# Patient Record
Sex: FEMALE | Race: WHITE
[De-identification: names, ages, dates, MRNs, and addresses within clinical notes are randomized per-mention and may not be internally consistent; named-entity substitution may affect disease eponyms.]

---

## 2020-01-02 ENCOUNTER — HOSPITAL ENCOUNTER (EMERGENCY)
Dept: HOSPITAL 84 - D.ER | Age: 25
Discharge: HOME | End: 2020-01-02
Payer: SELF-PAY

## 2020-01-02 VITALS
BODY MASS INDEX: 30.89 KG/M2 | BODY MASS INDEX: 30.89 KG/M2 | HEIGHT: 65 IN | WEIGHT: 185.39 LBS | HEIGHT: 65 IN | WEIGHT: 185.39 LBS

## 2020-01-02 VITALS — SYSTOLIC BLOOD PRESSURE: 113 MMHG | DIASTOLIC BLOOD PRESSURE: 78 MMHG

## 2020-01-02 DIAGNOSIS — G47.30: ICD-10-CM

## 2020-01-02 DIAGNOSIS — F44.5: Primary | ICD-10-CM

## 2020-01-16 ENCOUNTER — HOSPITAL ENCOUNTER (OUTPATIENT)
Dept: HOSPITAL 84 - D.MS | Age: 25
Discharge: HOME | End: 2020-01-16
Attending: UROLOGY
Payer: SELF-PAY

## 2020-01-16 VITALS — DIASTOLIC BLOOD PRESSURE: 73 MMHG | SYSTOLIC BLOOD PRESSURE: 115 MMHG

## 2020-01-16 VITALS — DIASTOLIC BLOOD PRESSURE: 62 MMHG | SYSTOLIC BLOOD PRESSURE: 101 MMHG

## 2020-01-16 VITALS — DIASTOLIC BLOOD PRESSURE: 70 MMHG | SYSTOLIC BLOOD PRESSURE: 119 MMHG

## 2020-01-16 VITALS — SYSTOLIC BLOOD PRESSURE: 108 MMHG | DIASTOLIC BLOOD PRESSURE: 57 MMHG

## 2020-01-16 VITALS — BODY MASS INDEX: 30.05 KG/M2 | HEIGHT: 65 IN | WEIGHT: 180.38 LBS

## 2020-01-16 VITALS — DIASTOLIC BLOOD PRESSURE: 61 MMHG | SYSTOLIC BLOOD PRESSURE: 104 MMHG

## 2020-01-16 VITALS — SYSTOLIC BLOOD PRESSURE: 101 MMHG | DIASTOLIC BLOOD PRESSURE: 62 MMHG

## 2020-01-16 DIAGNOSIS — R11.2: ICD-10-CM

## 2020-01-16 DIAGNOSIS — N20.1: Primary | ICD-10-CM

## 2020-01-16 DIAGNOSIS — N20.0: ICD-10-CM

## 2020-01-16 LAB
ALBUMIN SERPL-MCNC: 4.2 G/DL (ref 3.4–5)
ALP SERPL-CCNC: 75 U/L (ref 46–116)
ALT SERPL-CCNC: 30 U/L (ref 10–68)
AMYLASE SERPL-CCNC: 40 U/L (ref 25–115)
ANION GAP SERPL CALC-SCNC: 12.8 MMOL/L (ref 8–16)
APPEARANCE UR: (no result)
BACTERIA #/AREA URNS HPF: (no result) /HPF
BASOPHILS NFR BLD AUTO: 0.2 % (ref 0–2)
BILIRUB SERPL-MCNC: 0.95 MG/DL (ref 0.2–1.3)
BILIRUB SERPL-MCNC: NEGATIVE MG/DL
BUN SERPL-MCNC: 10 MG/DL (ref 7–18)
CALCIUM SERPL-MCNC: 11.3 MG/DL (ref 8.5–10.1)
CHLORIDE SERPL-SCNC: 104 MMOL/L (ref 98–107)
CO2 SERPL-SCNC: 26 MMOL/L (ref 21–32)
COLOR UR: YELLOW
CREAT SERPL-MCNC: 1.1 MG/DL (ref 0.6–1.3)
EOSINOPHIL NFR BLD: 0.1 % (ref 0–7)
ERYTHROCYTE [DISTWIDTH] IN BLOOD BY AUTOMATED COUNT: 12.3 % (ref 11.5–14.5)
GLOBULIN SER-MCNC: 4.1 G/L
GLUCOSE SERPL-MCNC: 93 MG/DL (ref 74–106)
GLUCOSE SERPL-MCNC: NEGATIVE MG/DL
HCG SERPL-ACNC: NEGATIVE M[IU]/ML
HCT VFR BLD CALC: 37.8 % (ref 36–48)
HGB BLD-MCNC: 12.9 G/DL (ref 12–16)
IMM GRANULOCYTES NFR BLD: 0.2 % (ref 0–5)
KETONES UR STRIP-MCNC: (no result) MG/DL
LIPASE SERPL-CCNC: 139 U/L (ref 73–393)
LYMPHOCYTES NFR BLD AUTO: 20.5 % (ref 15–50)
MCH RBC QN AUTO: 28.5 PG (ref 26–34)
MCHC RBC AUTO-ENTMCNC: 34.1 G/DL (ref 31–37)
MCV RBC: 83.4 FL (ref 80–100)
MONOCYTES NFR BLD: 17.3 % (ref 2–11)
MUCOUS THREADS #/AREA URNS LPF: (no result) /LPF
NEUTROPHILS NFR BLD AUTO: 61.7 % (ref 40–80)
NITRITE UR-MCNC: NEGATIVE MG/ML
OSMOLALITY SERPL CALC.SUM OF ELEC: 276 MOSM/KG (ref 275–300)
PH UR STRIP: 5 [PH] (ref 5–6)
PLATELET # BLD: 211 10X3/UL (ref 130–400)
PMV BLD AUTO: 10 FL (ref 7.4–10.4)
POTASSIUM SERPL-SCNC: 3.8 MMOL/L (ref 3.5–5.1)
PROT SERPL-MCNC: 8.3 G/DL (ref 6.4–8.2)
PROT UR-MCNC: (no result) MG/DL
RBC # BLD AUTO: 4.53 10X6/UL (ref 4–5.4)
RBC #/AREA URNS HPF: (no result) /HPF (ref 0–5)
SODIUM SERPL-SCNC: 139 MMOL/L (ref 136–145)
SP GR UR STRIP: 1.02 (ref 1–1.02)
SQUAMOUS #/AREA URNS HPF: (no result) /HPF (ref 0–5)
UROBILINOGEN UR-MCNC: 4 MG/DL
WBC # BLD AUTO: 8.4 10X3/UL (ref 4.8–10.8)

## 2020-01-17 NOTE — OP
PATIENT NAME:  MARY GALVEZ                                MEDICAL RECORD: [id number]
:[birth year]                                             LOCATION:D.MS           
                                                         ADMISSION DATE:        
SURGEON:  FROYLAN DACOSTA MD              
 
 
DATE OF OPERATION:  2020
 
SURGEON:  Froylan Dacosta MD
 
ANESTHESIA:  TIVA by DARLENE Lewis CRNA.
 
DIAGNOSIS:  Left renal stones with 9 mm in the proximal ureter and 7 mm in the
kidney.
 
PROCEDURES:  Cystoscopy, left retrograde pyelogram, left ureteral stent
insertion 6-Vatican citizen x 24 cm with string attached.
 
FINDINGS:  Inflamed bladder consistent with cystitis.  Single ureteral orifices
bilaterally.  No bladder tumors.  Radiodense left-sided renal stones on
fluoroscopy.
 
BLOOD LOSS:  None.
 
CLINICAL HISTORY:  This is a 24-year-old female with no previous history of
kidney stones.  She has a 4-day history of left flank pain with nausea and
vomiting.  She came to the Emergency Room today.  A CT scan shows a 9-mm stone
in the left proximal ureter.  There were some smaller stones in the left kidney
with the largest of them being 7 mm in size.  Her urinalysis is suggestive of
cystitis.  I started her on IV Levaquin after obtaining a urine culture.  She
comes now to have a left ureteral stent inserted.  As an outpatient, she will be
getting left lithotripsy to treat the stones.
 
DESCRIPTION OF PROCEDURE:  The patient was given IV sedation.  She was then
placed in the lithotomy position.  We performed fluoroscopy and we could see two
densities in the left renal region which are probably our stones.  Cystoscopy
was performed using a 21-Vatican citizen cystoscope.  The bladder was diffusely inflamed,
consistent with cystitis.  No bladder tumors were seen.  A 5-Vatican citizen open-ended
ureteral catheter was inserted into the left ureteral orifice and dilated
contrast was injected for a retrograde pyelogram.  This showed that the
radiodensities were indeed our renal stones.  The stone in the proximal ureter
is creating hydronephrosis proximal to it.  Through the lumen of the ureteral
catheter, we inserted a sensor wire up into the renal pelvis.  The ureteral
catheter was then removed, leaving the wire in place.  Over the wire, we
inserted a 6-Vatican citizen x 24 cm ureteral stent.  Once the stent was in correct
position, the wire was withdrawn entirely.  The stent was seen to coil
proximally and distally.  The bladder was then emptied through the cystoscope
sheath and the scope was removed.  The string on the distal end of the stent was
maintained.  It was taped to the suprapubic region with a small piece of
Tegaderm.  The patient will be discharged home with Norco, tamsulosin, and
Levaquin.  We will be contacting her to arrange lithotripsy.
 
TRANSINT:[id number] Voice Confirmation ID: [id number] DOCUMENT ID: [id number]
 
 
 
OPERATIVE REPORT                               [id number]    OHL,FROYLAN POWER MD              
 
 
 
Electronically Signed by FROYLAN DACOSTA on 20 at 0843
 
 
 
 
 
 
 
 
 
 
 
 
 
 
 
 
 
 
 
 
 
 
 
 
 
 
 
 
 
 
 
 
 
 
 
 
 
 
 
 
 
CC:                                                             [id number]
DICTATION DATE: 20 1535     :     20 1652      [facility] [place] 
                                                                      20
[facility]                                          
[address] [place] [address]

## 2020-01-31 ENCOUNTER — HOSPITAL ENCOUNTER (EMERGENCY)
Dept: HOSPITAL 84 - D.ER | Age: 25
Discharge: OTHER INSTITUTION | End: 2020-01-31
Payer: MEDICAID

## 2020-01-31 VITALS — DIASTOLIC BLOOD PRESSURE: 81 MMHG | SYSTOLIC BLOOD PRESSURE: 129 MMHG

## 2020-01-31 VITALS — WEIGHT: 180.38 LBS | HEIGHT: 65 IN | BODY MASS INDEX: 30.05 KG/M2

## 2020-01-31 DIAGNOSIS — R31.9: ICD-10-CM

## 2020-01-31 DIAGNOSIS — N13.30: Primary | ICD-10-CM

## 2020-01-31 DIAGNOSIS — N20.0: ICD-10-CM

## 2020-01-31 DIAGNOSIS — R52: ICD-10-CM

## 2020-01-31 LAB
ALBUMIN SERPL-MCNC: 4.1 G/DL (ref 3.4–5)
ALP SERPL-CCNC: 62 U/L (ref 30–120)
ALT SERPL-CCNC: 18 U/L (ref 10–68)
ANION GAP SERPL CALC-SCNC: 14.7 MMOL/L (ref 8–16)
APPEARANCE UR: (no result)
BACTERIA #/AREA URNS HPF: (no result) /HPF
BASOPHILS NFR BLD AUTO: 0.1 % (ref 0–2)
BILIRUB SERPL-MCNC: 0.73 MG/DL (ref 0.2–1.3)
BILIRUB SERPL-MCNC: NEGATIVE MG/DL
BUN SERPL-MCNC: 8 MG/DL (ref 7–18)
CALCIUM SERPL-MCNC: 11.2 MG/DL (ref 8.5–10.1)
CHLORIDE SERPL-SCNC: 105 MMOL/L (ref 98–107)
CO2 SERPL-SCNC: 24.3 MMOL/L (ref 21–32)
COLOR UR: YELLOW
CREAT SERPL-MCNC: 0.7 MG/DL (ref 0.6–1.3)
EOSINOPHIL NFR BLD: 0.8 % (ref 0–7)
ERYTHROCYTE [DISTWIDTH] IN BLOOD BY AUTOMATED COUNT: 12.7 % (ref 11.5–14.5)
GLOBULIN SER-MCNC: 3.8 G/L
GLUCOSE SERPL-MCNC: 85 MG/DL (ref 74–106)
GLUCOSE SERPL-MCNC: NEGATIVE MG/DL
HCG UR QL: NEGATIVE
HCT VFR BLD CALC: 38 % (ref 36–48)
HGB BLD-MCNC: 13 G/DL (ref 12–16)
IMM GRANULOCYTES NFR BLD: 0.3 % (ref 0–5)
KETONES UR STRIP-MCNC: NEGATIVE MG/DL
LYMPHOCYTES NFR BLD AUTO: 18 % (ref 15–50)
MCH RBC QN AUTO: 28.3 PG (ref 26–34)
MCHC RBC AUTO-ENTMCNC: 34.2 G/DL (ref 31–37)
MCV RBC: 82.8 FL (ref 80–100)
MONOCYTES NFR BLD: 7.3 % (ref 2–11)
NEUTROPHILS NFR BLD AUTO: 73.5 % (ref 40–80)
NITRITE UR-MCNC: POSITIVE MG/ML
OSMOLALITY SERPL CALC.SUM OF ELEC: 275 MOSM/KG (ref 275–300)
PH UR STRIP: 7 [PH] (ref 5–6)
PLATELET # BLD: 289 10X3/UL (ref 130–400)
PMV BLD AUTO: 9.9 FL (ref 7.4–10.4)
POTASSIUM SERPL-SCNC: 4 MMOL/L (ref 3.5–5.1)
PROT SERPL-MCNC: 7.9 G/DL (ref 6.4–8.2)
PROT UR-MCNC: (no result) MG/DL
RBC # BLD AUTO: 4.59 10X6/UL (ref 4–5.4)
RBC #/AREA URNS HPF: (no result) /HPF (ref 0–5)
SODIUM SERPL-SCNC: 140 MMOL/L (ref 136–145)
SP GR UR STRIP: 1.01 (ref 1–1.02)
SQUAMOUS #/AREA URNS HPF: (no result) /HPF (ref 0–5)
UROBILINOGEN UR-MCNC: NORMAL MG/DL
WBC # BLD AUTO: 14.2 10X3/UL (ref 4.8–10.8)
WBC #/AREA URNS HPF: (no result) /HPF

## 2020-02-18 ENCOUNTER — HOSPITAL ENCOUNTER (INPATIENT)
Dept: HOSPITAL 84 - D.ER | Age: 25
LOS: 4 days | Discharge: HOME | DRG: 690 | End: 2020-02-22
Attending: FAMILY MEDICINE | Admitting: FAMILY MEDICINE
Payer: MEDICAID

## 2020-02-18 VITALS
HEIGHT: 65 IN | BODY MASS INDEX: 29.22 KG/M2 | WEIGHT: 175.37 LBS | BODY MASS INDEX: 29.22 KG/M2 | HEIGHT: 65 IN | WEIGHT: 175.37 LBS

## 2020-02-18 DIAGNOSIS — B96.89: ICD-10-CM

## 2020-02-18 DIAGNOSIS — N39.0: ICD-10-CM

## 2020-02-18 DIAGNOSIS — N20.0: ICD-10-CM

## 2020-02-18 DIAGNOSIS — D64.9: ICD-10-CM

## 2020-02-18 DIAGNOSIS — N10: Primary | ICD-10-CM

## 2020-02-18 DIAGNOSIS — E87.6: ICD-10-CM

## 2020-02-18 LAB
ALBUMIN SERPL-MCNC: 3 G/DL (ref 3.4–5)
ALP SERPL-CCNC: 65 U/L (ref 30–120)
ALT SERPL-CCNC: 12 U/L (ref 10–68)
ANION GAP SERPL CALC-SCNC: 13 MMOL/L (ref 8–16)
BACTERIA #/AREA URNS HPF: (no result) /HPF
BASOPHILS NFR BLD AUTO: 0.1 % (ref 0–2)
BILIRUB SERPL-MCNC: 0.63 MG/DL (ref 0.2–1.3)
BILIRUB SERPL-MCNC: NEGATIVE MG/DL
BUN SERPL-MCNC: 10 MG/DL (ref 7–18)
CALCIUM SERPL-MCNC: 11.3 MG/DL (ref 8.5–10.1)
CHLORIDE SERPL-SCNC: 99 MMOL/L (ref 98–107)
CO2 SERPL-SCNC: 24.4 MMOL/L (ref 21–32)
CREAT SERPL-MCNC: 0.9 MG/DL (ref 0.6–1.3)
EOSINOPHIL NFR BLD: 0 % (ref 0–7)
ERYTHROCYTE [DISTWIDTH] IN BLOOD BY AUTOMATED COUNT: 12.7 % (ref 11.5–14.5)
GLOBULIN SER-MCNC: 4.2 G/L
GLUCOSE SERPL-MCNC: 110 MG/DL (ref 74–106)
GLUCOSE SERPL-MCNC: NEGATIVE MG/DL
HCG UR QL: NEGATIVE
HCT VFR BLD CALC: 35.2 % (ref 36–48)
HGB BLD-MCNC: 11.9 G/DL (ref 12–16)
IMM GRANULOCYTES NFR BLD: 0.4 % (ref 0–5)
KETONES UR STRIP-MCNC: (no result) MG/DL
LYMPHOCYTES NFR BLD AUTO: 5.2 % (ref 15–50)
MCH RBC QN AUTO: 28.3 PG (ref 26–34)
MCHC RBC AUTO-ENTMCNC: 33.8 G/DL (ref 31–37)
MCV RBC: 83.6 FL (ref 80–100)
MONOCYTES NFR BLD: 19.7 % (ref 2–11)
NEUTROPHILS NFR BLD AUTO: 74.6 % (ref 40–80)
NITRITE UR-MCNC: NEGATIVE MG/ML
OSMOLALITY SERPL CALC.SUM OF ELEC: 265 MOSM/KG (ref 275–300)
PH UR STRIP: 5 [PH] (ref 5–6)
PLATELET # BLD: 156 10X3/UL (ref 130–400)
PMV BLD AUTO: 11.2 FL (ref 7.4–10.4)
POTASSIUM SERPL-SCNC: 3.4 MMOL/L (ref 3.5–5.1)
PROT SERPL-MCNC: 7.2 G/DL (ref 6.4–8.2)
RBC # BLD AUTO: 4.21 10X6/UL (ref 4–5.4)
RBC #/AREA URNS HPF: (no result) /HPF (ref 0–5)
SODIUM SERPL-SCNC: 133 MMOL/L (ref 136–145)
SP GR UR STRIP: 1.01 (ref 1–1.02)
SQUAMOUS #/AREA URNS HPF: (no result) /HPF (ref 0–5)
UROBILINOGEN UR-MCNC: 1 MG/DL
WBC # BLD AUTO: 12 10X3/UL (ref 4.8–10.8)

## 2020-02-18 NOTE — NUR
PT TO ROOM 2237 FROM ER VIA WHEELCHAIR. PT IS WITHOUT DISTRESS.ASSESSMENT PER
ADMIT PACK.CALL LIGHT IN REACH

## 2020-02-19 LAB
ANION GAP SERPL CALC-SCNC: 12.1 MMOL/L (ref 8–16)
ANION GAP SERPL CALC-SCNC: 18.6 MMOL/L (ref 8–16)
APTT BLD: 33.9 SECONDS (ref 22.8–39.4)
BASOPHILS NFR BLD AUTO: 0.1 % (ref 0–2)
BUN SERPL-MCNC: 14 MG/DL (ref 7–18)
BUN SERPL-MCNC: 8 MG/DL (ref 7–18)
CALCIUM SERPL-MCNC: 11 MG/DL (ref 8.5–10.1)
CALCIUM SERPL-MCNC: 11.6 MG/DL (ref 8.5–10.1)
CHLORIDE SERPL-SCNC: 101 MMOL/L (ref 98–107)
CHLORIDE SERPL-SCNC: 103 MMOL/L (ref 98–107)
CO2 SERPL-SCNC: 20.3 MMOL/L (ref 21–32)
CO2 SERPL-SCNC: 25 MMOL/L (ref 21–32)
CREAT SERPL-MCNC: 0.8 MG/DL (ref 0.6–1.3)
CREAT SERPL-MCNC: 0.8 MG/DL (ref 0.6–1.3)
EOSINOPHIL NFR BLD: 0 % (ref 0–7)
ERYTHROCYTE [DISTWIDTH] IN BLOOD BY AUTOMATED COUNT: 13 % (ref 11.5–14.5)
GLUCOSE SERPL-MCNC: 86 MG/DL (ref 74–106)
GLUCOSE SERPL-MCNC: 93 MG/DL (ref 74–106)
HCT VFR BLD CALC: 29.6 % (ref 36–48)
HGB BLD-MCNC: 10 G/DL (ref 12–16)
IMM GRANULOCYTES NFR BLD: 0.5 % (ref 0–5)
INR PPP: 1.22 (ref 0.85–1.17)
LYMPHOCYTES NFR BLD AUTO: 8.5 % (ref 15–50)
MAGNESIUM SERPL-MCNC: 1.8 MG/DL (ref 1.8–2.4)
MCH RBC QN AUTO: 27.9 PG (ref 26–34)
MCHC RBC AUTO-ENTMCNC: 33.8 G/DL (ref 31–37)
MCV RBC: 82.7 FL (ref 80–100)
MONOCYTES NFR BLD: 21.7 % (ref 2–11)
NEUTROPHILS NFR BLD AUTO: 69.2 % (ref 40–80)
OSMOLALITY SERPL CALC.SUM OF ELEC: 267 MOSM/KG (ref 275–300)
OSMOLALITY SERPL CALC.SUM OF ELEC: 275 MOSM/KG (ref 275–300)
PHOSPHATE SERPL-MCNC: 2.1 MG/DL (ref 2.5–4.9)
PLATELET # BLD: 149 10X3/UL (ref 130–400)
PMV BLD AUTO: 10.5 FL (ref 7.4–10.4)
POTASSIUM SERPL-SCNC: 3.1 MMOL/L (ref 3.5–5.1)
POTASSIUM SERPL-SCNC: 3.9 MMOL/L (ref 3.5–5.1)
PROTHROMBIN TIME: 15.4 SECONDS (ref 11.6–15)
RBC # BLD AUTO: 3.58 10X6/UL (ref 4–5.4)
SODIUM SERPL-SCNC: 135 MMOL/L (ref 136–145)
SODIUM SERPL-SCNC: 138 MMOL/L (ref 136–145)
WBC # BLD AUTO: 9.3 10X3/UL (ref 4.8–10.8)

## 2020-02-19 NOTE — NUR
REC'D. IN BED DURING WALKING ROUNDS. EYES CLOSED RESP DEEP AND EVEN.WILL
CONTINUE TO MONITOR FOR ANY CHGES IN CURRENT STATUS AND FOLLOW PLAN OF CARE.

## 2020-02-19 NOTE — NUR
PT RESTING IN BED. NO SIGNS OF DISTRESS. IV TO LEFT HAND PATENT NO REDNESS OR
TENDERNESS. DENIES ANY FURTHER NEED AT THIS TIME. CALL LIGHT IN REACH. BED LOW
POSITION. FAMILY AT BEDSIDE.

## 2020-02-20 LAB
ANION GAP SERPL CALC-SCNC: 10.2 MMOL/L (ref 8–16)
BUN SERPL-MCNC: 8 MG/DL (ref 7–18)
CALCIUM SERPL-MCNC: 11.1 MG/DL (ref 8.5–10.1)
CHLORIDE SERPL-SCNC: 104 MMOL/L (ref 98–107)
CO2 SERPL-SCNC: 26.8 MMOL/L (ref 21–32)
CREAT SERPL-MCNC: 0.7 MG/DL (ref 0.6–1.3)
ERYTHROCYTE [DISTWIDTH] IN BLOOD BY AUTOMATED COUNT: 13.2 % (ref 11.5–14.5)
FERRITIN SERPL-MCNC: 291 NG/ML (ref 3–244)
GLUCOSE SERPL-MCNC: 88 MG/DL (ref 74–106)
HCT VFR BLD CALC: 31.3 % (ref 36–48)
HGB BLD-MCNC: 10.5 G/DL (ref 12–16)
IRON SERPL-MCNC: 9 UG/DL (ref 35–150)
LYMPHOCYTES NFR BLD AUTO: 17 % (ref 15–50)
MAGNESIUM SERPL-MCNC: 1.7 MG/DL (ref 1.8–2.4)
MCH RBC QN AUTO: 27.7 PG (ref 26–34)
MCHC RBC AUTO-ENTMCNC: 33.5 G/DL (ref 31–37)
MCV RBC: 82.6 FL (ref 80–100)
MONOCYTES NFR BLD: 17 % (ref 2–11)
NEUTROPHILS NFR BLD AUTO: 59 % (ref 40–80)
OSMOLALITY SERPL CALC.SUM OF ELEC: 272 MOSM/KG (ref 275–300)
PHOSPHATE SERPL-MCNC: 1.4 MG/DL (ref 2.5–4.9)
PLATELET # BLD EST: NORMAL 10*3/UL
PLATELET # BLD: 183 10X3/UL (ref 130–400)
PMV BLD AUTO: 10.6 FL (ref 7.4–10.4)
POTASSIUM SERPL-SCNC: 3 MMOL/L (ref 3.5–5.1)
RBC # BLD AUTO: 3.79 10X6/UL (ref 4–5.4)
ROULEAUX BLD QL SMEAR: (no result)
SAO2 % BLD FROM PO2: 6 % (ref 15–55)
SODIUM SERPL-SCNC: 138 MMOL/L (ref 136–145)
TIBC SERPL-MCNC: 147 UG/DL (ref 260–445)
UIBC SERPL-MCNC: 138 UG/DL (ref 150–375)
WBC # BLD AUTO: 7.1 10X3/UL (ref 4.8–10.8)

## 2020-02-20 NOTE — NUR
PATIENT LYING IN BED AT THE BEGINNING OF SHIFT, WATCHING TV. PATIENT DENIES
PAIN OR NEEDS AT THIS TIME AND IS ABLE TO MAKE NEEDS KNOWN. CALL LIGHT WITHIN
REACH.

## 2020-02-20 NOTE — NUR
PATIENT ON ROOM PHONE. LAYING CURLED UP IN A BALL ON RIGHT SIDE. CL IN REACH.
DENIES NEEDS AT THIS TIME.

## 2020-02-20 NOTE — MORECARE
CASE MANAGEMENT DISCHARGE SUMMARY
 
 
PATIENT: MARY GALVEZ                          UNIT: [id number]
ACCOUNT#: [id number]                       ADM DATE: 20
AGE: 25     : [birth year]  SEX: F            ROOM/BED: D.2237    
AUTHOR: [email or web address]                             PHYSICIAN:                               
 
REFERRING PHYSICIAN: JOSE G MORALES DO           
DATE OF SERVICE: 20
Discharge Plan
 
 
Patient Name: MARY GALVEZ
Facility: [facility]:[place]
Encounter #: [id number]
Medical Record #: [id number]
: 1995
Planned Disposition: Home
Anticipated Discharge Date: 
 
Discharge Date: 
Expected LOS: 
Initial Reviewer: [id number]
Initial Review Date: 2020
Generated: 20   2:37 pm 
Comments
 
DCP- Discharge Planning
 
Updated by [email or web address]: Mary Kate Bowman on 20  12:36 pm CT
Patient Name: MARY GALVEZ                                     
Admission Status: ER   
Accout number: [id number]                              
Admission Date: 2020   
: 1995                                                        
Admission Diagnosis:   
Attending: JOSE G MORALES                                                
Current LOS:  2   
  
Anticipated DC Date:    
Planned Disposition: Home   
Primary Insurance: [place]   
  
  
Discharge Planning Comments: CM met with patient to discuss discharge 
planning/needs, she is alone in the room. She lives with a room mate, Kanwal. 
She states Kanwal will drive her home on discharge. She is independent with 
all her care and states she does drive. States she has transportation to and 
from the hospital and doctors appointments and wherever she needs to go. 
 
States she will need assistance with purchasing her medication until her 
Medicaid is active. I called [facility] pharmacy on [facility] and her Omnicef 
300mg PO BID for 3 weeks #42 will cost $27.48 approved by Kandy Andre. I 
[facility] when confirmed by the physician on 
discharge. CM will continue to follow and assist with discharge 
planning/needs.  
  
  
  
  
  
  
: Mary Kate Bowman
 DCPIA - Discharge Planning Initial Assessment
 
Updated by [id number]: Mary Kate Bowman on 20   1:33 pm
*  Is the patient Alert and Oriented?
Yes
*  How many steps to enter\exit or inside your home? 0/0 *  PCP No PCP *  [facility]acy
[facility] [address]
*  Preadmission Environment
Home with Family
*  ADLs
Independent
*  Equipment
None
*  List name and contact numbers for known caregivers / representatives who 
currently or will assist patient after discharge:
[facility]wal [facility] - [phone]
*  Verbal permission to speak to the caregivers and representatives has been 
obtained from the patient.
Yes
*  Community resources currently utilized
None
*  Additional services required to return to the preadmission environment?
Yes
*  Can the patient safely return to the preadmission environment?
Yes
*  Has this patient been hospitalized within the prior 30 days at any 
hospital?
No
 
 
 
 
 
 
Patient Name: MARY GALVEZ
 
Encounter #: [id number]
Page [phone]
 
 
 
 
 
Electronically Signed by ALISSA JO on 20 at 1337
 
 
 
 
 
 
**All edits/amendments must be made on the electronic document**
 
DICTATION DATE: 20     : TANIA  20     
RPT#: [id number]                                DC DATE:        
                                               STATUS: ADM IN  
[facility]
 [place] [address]
***END OF REPORT***

## 2020-02-21 LAB
ANION GAP SERPL CALC-SCNC: 8.6 MMOL/L (ref 8–16)
BASOPHILS NFR BLD AUTO: 0.3 % (ref 0–2)
BUN SERPL-MCNC: 6 MG/DL (ref 7–18)
CALCIUM SERPL-MCNC: 10.9 MG/DL (ref 8.5–10.1)
CHLORIDE SERPL-SCNC: 106 MMOL/L (ref 98–107)
CO2 SERPL-SCNC: 28.2 MMOL/L (ref 21–32)
CREAT SERPL-MCNC: 0.7 MG/DL (ref 0.6–1.3)
EOSINOPHIL NFR BLD: 0.8 % (ref 0–7)
ERYTHROCYTE [DISTWIDTH] IN BLOOD BY AUTOMATED COUNT: 13.1 % (ref 11.5–14.5)
GLUCOSE SERPL-MCNC: 88 MG/DL (ref 74–106)
HCT VFR BLD CALC: 30.7 % (ref 36–48)
HGB BLD-MCNC: 10.3 G/DL (ref 12–16)
IMM GRANULOCYTES NFR BLD: 0.6 % (ref 0–5)
LYMPHOCYTES NFR BLD AUTO: 28.8 % (ref 15–50)
MAGNESIUM SERPL-MCNC: 1.7 MG/DL (ref 1.8–2.4)
MCH RBC QN AUTO: 27.6 PG (ref 26–34)
MCHC RBC AUTO-ENTMCNC: 33.6 G/DL (ref 31–37)
MCV RBC: 82.3 FL (ref 80–100)
MONOCYTES NFR BLD: 18.5 % (ref 2–11)
NEUTROPHILS NFR BLD AUTO: 51 % (ref 40–80)
OSMOLALITY SERPL CALC.SUM OF ELEC: 275 MOSM/KG (ref 275–300)
PHOSPHATE SERPL-MCNC: 2.3 MG/DL (ref 2.5–4.9)
PLATELET # BLD: 221 10X3/UL (ref 130–400)
PMV BLD AUTO: 10.2 FL (ref 7.4–10.4)
POTASSIUM SERPL-SCNC: 2.8 MMOL/L (ref 3.5–5.1)
RBC # BLD AUTO: 3.73 10X6/UL (ref 4–5.4)
SODIUM SERPL-SCNC: 140 MMOL/L (ref 136–145)
WBC # BLD AUTO: 6.2 10X3/UL (ref 4.8–10.8)

## 2020-02-21 NOTE — NUR
NUTRITION F/U
CHART REVIEWED. PT VISIT. TOLERATING LUNCH, REPORTS MUCH IMPROVED APPETITE AND
PO INTAKE. WILL CONTINUE TO PROVIDE DIET, MONITOR PO INTAKE.
RD FOLLOWING

## 2020-02-21 NOTE — MORECARE
CASE MANAGEMENT DISCHARGE SUMMARY
 
 
PATIENT: MARY GALVEZ                          UNIT: [id number]
ACCOUNT#: [id number]                       ADM DATE: 20
AGE: 25     : [birth year]  SEX: F            ROOM/BED: D.2237    
AUTHOR: ALISSA JO                             PHYSICIAN:                               
 
REFERRING PHYSICIAN: JOSE G OMRALES DO           
DATE OF SERVICE: 20
Discharge Plan
 
 
Patient Name: MARY GALVEZ
Facility: [facility]:[place]
Encounter #: [id number]
Medical Record #: [id number]
: 1995
Planned Disposition: Home
Anticipated Discharge Date: 
 
Discharge Date: 
Expected LOS: 
Initial Reviewer: [id number]
Initial Review Date: 2020
Generated: 20   4:57 pm 
Comments
 
DCP- Discharge Planning
 
Updated by [id number]: Mary Kate Bowman on 20   2:49 pm CT
Patient has been given the Omnicef 300mg PO BID #42 tablets for home use. She 
understands how to take it post discharge.
DCP- Discharge Planning
 
Updated by [id number]: Mary Kate Bowman on 20  12:36 pm CT
Patient Name: MARY GALVEZ                                     
Admission Status: ER   
Accout number: [id number]                              
Admission Date: 2020   
: 1995                                                        
Admission Diagnosis:   
Attending: JOSE G MORALES                                                
Current LOS:  2   
  
Anticipated DC Date:    
Planned Disposition: Home   
Primary Insurance: [place]   
  
  
 
Discharge Planning Comments: CM met with patient to discuss discharge 
planning/needs, she is alone in the room. She lives with a room mate, Kanwal. 
She states Kanwal will drive her home on discharge. She is independent with 
all her care and states she does drive. States she has transportation to and 
from the hospital and doctors appointments and wherever she needs to go. 
States she will need assistance with purchasing her medication until her 
Medicaid is active. I called [facility] pharmacy on [facility] and her Omnicef 
300mg PO BID for 3 weeks #42 will cost $27.48 approved by Kandy Andre. I 
will call the medication in to [facility] when confirmed by the physician on 
discharge. CM will continue to follow and assist with discharge 
planning/needs.  
  
  
  
  
  
  
: Mary Kate Bowman
 DCPIA - Discharge Planning Initial Assessment
 
Updated by [email or web address]: Mary Kate Bowman on 20   1:33 pm
*  Is the patient Alert and Oriented?
Yes
*  How many steps to enter\exit or inside your home? 0/0 *  PCP No PCP *  Ph[facility]acy
[facility] [address]
*  Preadmission Environment
Home with Family
*  ADLs
Independent
*  Equipment
None
*  List name and contact numbers for known caregivers / representatives who 
currently or will assist patient after discharge:
[facility] - [phone]
*  Verbal permission to speak to the caregivers and representatives has been 
obtained from the patient.
Yes
*  Community resources currently utilized
None
*  Additional services required to return to the preadmission environment?
Yes
*  Can the patient safely return to the preadmission environment?
Yes
*  Has this patient been hospitalized within the prior 30 days at any 
hospital?
No
 
 
 
 
 
 
 
 
Last DP export: 20  12:37 p
Patient Name: MARY GALVEZ
Encounter #: [id number]
Page [phone]
 
 
 
 
 
Electronically Signed by ALISSA JO on 20 at 1558
 
 
 
 
 
 
**All edits/amendments must be made on the electronic document**
 
DICTATION DATE: 20     : TANIA  20     
RPT#: [id number]                                DC DATE:        
                                               STATUS: ADM IN  
[facility]
[address] [place] [address]
***END OF REPORT***

## 2020-02-22 VITALS — SYSTOLIC BLOOD PRESSURE: 115 MMHG | DIASTOLIC BLOOD PRESSURE: 39 MMHG

## 2020-02-22 LAB
ANION GAP SERPL CALC-SCNC: 10.5 MMOL/L (ref 8–16)
BASOPHILS NFR BLD AUTO: 0.3 % (ref 0–2)
BUN SERPL-MCNC: 8 MG/DL (ref 7–18)
CALCIUM SERPL-MCNC: 11 MG/DL (ref 8.5–10.1)
CHLORIDE SERPL-SCNC: 108 MMOL/L (ref 98–107)
CO2 SERPL-SCNC: 26.3 MMOL/L (ref 21–32)
CREAT SERPL-MCNC: 0.7 MG/DL (ref 0.6–1.3)
EOSINOPHIL NFR BLD: 1.1 % (ref 0–7)
ERYTHROCYTE [DISTWIDTH] IN BLOOD BY AUTOMATED COUNT: 13.5 % (ref 11.5–14.5)
GLUCOSE SERPL-MCNC: 83 MG/DL (ref 74–106)
HCT VFR BLD CALC: 33.9 % (ref 36–48)
HGB BLD-MCNC: 11.2 G/DL (ref 12–16)
IMM GRANULOCYTES NFR BLD: 0.6 % (ref 0–5)
LYMPHOCYTES NFR BLD AUTO: 41.6 % (ref 15–50)
MAGNESIUM SERPL-MCNC: 2 MG/DL (ref 1.8–2.4)
MCH RBC QN AUTO: 27.5 PG (ref 26–34)
MCHC RBC AUTO-ENTMCNC: 33 G/DL (ref 31–37)
MCV RBC: 83.1 FL (ref 80–100)
MONOCYTES NFR BLD: 10.3 % (ref 2–11)
NEUTROPHILS NFR BLD AUTO: 46.1 % (ref 40–80)
OSMOLALITY SERPL CALC.SUM OF ELEC: 277 MOSM/KG (ref 275–300)
PHOSPHATE SERPL-MCNC: 2.8 MG/DL (ref 2.5–4.9)
PLATELET # BLD: 284 10X3/UL (ref 130–400)
PMV BLD AUTO: 9.8 FL (ref 7.4–10.4)
POTASSIUM SERPL-SCNC: 3.8 MMOL/L (ref 3.5–5.1)
RBC # BLD AUTO: 4.08 10X6/UL (ref 4–5.4)
SODIUM SERPL-SCNC: 141 MMOL/L (ref 136–145)
WBC # BLD AUTO: 6.2 10X3/UL (ref 4.8–10.8)

## 2020-02-22 NOTE — NUR
PATIENT RESTING QUIETLY, IV FLUIDS INFUSING. IV SITE IS PATENT, NO REDNESS OR
TENDERNESS NOTED TO IV SITE. PATIENT DENIES PAIN. NO SIGNS AND SYMPTOMS OF
HYPOKALEMIA. PO POTASSIUM GIVEN EARLIER THIS SHIFT FOR A POTASSIUM LEVEL OF
3.4. SIGNIFICANT AT BEDSIDE

## 2020-02-22 NOTE — NUR
PT ALERT X 4. BREATH SOUNDS CLEAR BILAT. IV TO RIGHT HAND, PATENT, DRESSING
CDI. PT REPORTING NO PAIN AT THIS TIME. BED LOW, CALL LIGHT IN REACH. NO OTHER
NEEDS AT THIS TIME.

## 2020-02-22 NOTE — NUR
DISCHARGE PAPERWORK SIGNED, ALL QUESTIONS ANSWERED. IV TO RIGHT HAND DC'D, TIP
INTACT. PT REQUESTED TO AMBULATE OUT. GIVEN WORK RELEASE TO RETURN TO WORK ON
MONDAY 2/24/20.

## 2020-02-23 NOTE — MORECARE
CASE MANAGEMENT DISCHARGE SUMMARY
 
 
PATIENT: MARY GALVEZ                          UNIT: [id number]
ACCOUNT#: [id number]                       ADM DATE: 20
AGE: 25     : [birth year]  SEX: F            ROOM/BED: D.2237    
AUTHOR: ALISSA JO                             PHYSICIAN:                               
 
REFERRING PHYSICIAN: JOSE G MORALES DO           
DATE OF SERVICE: 20
Discharge Plan
 
 
Patient Name: MARY GALVEZ
Facility: [facility]:[place]
Encounter #: [id number]
Medical Record #: [id number]
: 1995
Planned Disposition: Home
Anticipated Discharge Date: 
 
Discharge Date: 2020
Expected LOS: 
Initial Reviewer: [id number]
Initial Review Date: 2020
Generated: 20   4:02 pm 
Comments
 
DCP- Discharge Planning
 
Updated by [id number]: Mary Kate Bowman on 20   2:49 pm CT
Patient has been given the Omnicef 300mg PO BID #42 tablets for home use. She 
understands how to take it post discharge.
DCP- Discharge Planning
 
Updated by [id number]: Mary Kate Bowman on 20  12:36 pm CT
Patient Name: MARY GALVEZ                                     
Admission Status: ER   
Accout number: [id number]                              
Admission Date: 2020   
: 1995                                                        
Admission Diagnosis:   
Attending: JOSE G MORALES                                                
Current LOS:  2   
  
Anticipated DC Date:    
Planned Disposition: Home   
Primary Insurance: [place]   
  
  
 
Discharge Planning Comments: CM met with patient to discuss discharge 
planning/needs, she is alone in the room. She lives with a room mate, Kanwal. 
She states Kanwal will drive her home on discharge. She is independent with 
all her care and states she does drive. States she has transportation to and 
from the hospital and doctors appointments and wherever she needs to go. 
States she will need assistance with purchasing her medication until her 
Medicaid is active. I called [facility] on [facility] and her Omnicef 
300mg PO BID for 3 weeks #42 will cost $27.48 approved by Kandy Andre. I 
will call the medication in to [facility] when confirmed by the physician on 
discharge. CM will continue to follow and assist with discharge 
planning/needs.  
  
  
  
  
  
  
: Mary Kate Bowman
 DCPIA - Discharge Planning Initial Assessment
 
Updated by [id number]: Mary Kate Bowman on 20   1:33 pm
*  Is the patient Alert and Oriented?
Yes
*  How many steps to enter\exit or inside your home? 0/0 *  PCP No PCP *  Ph[facility]acy
[facility] [address]
*  Preadmission Environment
Home with Family
*  ADLs
Independent
*  Equipment
None
*  List name and contact numbers for known caregivers / representatives who 
currently or will assist patient after discharge:
[facility] [phone]
*  Verbal permission to speak to the caregivers and representatives has been 
obtained from the patient.
Yes
*  Community resources currently utilized
None
*  Additional services required to return to the preadmission environment?
Yes
*  Can the patient safely return to the preadmission environment?
Yes
*  Has this patient been hospitalized within the prior 30 days at any 
hospital?
No
 
 
 
 
 
 
 
 
Last DP export: 20   2:58 p
Patient Name: MARY GALVEZ
Encounter #: [id number]
Page [phone]
 
 
 
 
 
Electronically Signed by ALISSA JO on 20 at 1502
 
 
 
 
 
 
**All edits/amendments must be made on the electronic document**
 
DICTATION DATE: 20 1502     : TANIA  20 1502     
RPT#: [id number]                                DC DATE:20
                                               STATUS: DIS IN  
[facility]
[address] [place] [address]
***END OF REPORT***